# Patient Record
Sex: FEMALE | Race: WHITE | NOT HISPANIC OR LATINO | Employment: UNEMPLOYED | URBAN - METROPOLITAN AREA
[De-identification: names, ages, dates, MRNs, and addresses within clinical notes are randomized per-mention and may not be internally consistent; named-entity substitution may affect disease eponyms.]

---

## 2023-10-26 ENCOUNTER — OFFICE VISIT (OUTPATIENT)
Dept: URGENT CARE | Facility: CLINIC | Age: 18
End: 2023-10-26
Payer: COMMERCIAL

## 2023-10-26 VITALS
HEIGHT: 60 IN | DIASTOLIC BLOOD PRESSURE: 64 MMHG | HEART RATE: 100 BPM | BODY MASS INDEX: 23.37 KG/M2 | OXYGEN SATURATION: 98 % | SYSTOLIC BLOOD PRESSURE: 112 MMHG | WEIGHT: 119.05 LBS

## 2023-10-26 DIAGNOSIS — N92.6 IRREGULAR PERIODS: ICD-10-CM

## 2023-10-26 DIAGNOSIS — N89.8 VAGINAL DISCHARGE: Primary | ICD-10-CM

## 2023-10-26 DIAGNOSIS — Z32.01 POSITIVE URINE PREGNANCY TEST (HHS-HCC): ICD-10-CM

## 2023-10-26 LAB — PREGNANCY TEST URINE, POC: POSITIVE

## 2023-10-26 PROCEDURE — 87205 SMEAR GRAM STAIN: CPT

## 2023-10-26 PROCEDURE — 87661 TRICHOMONAS VAGINALIS AMPLIF: CPT

## 2023-10-26 PROCEDURE — 99214 OFFICE O/P EST MOD 30 MIN: CPT | Performed by: PHYSICIAN ASSISTANT

## 2023-10-26 PROCEDURE — 87800 DETECT AGNT MULT DNA DIREC: CPT

## 2023-10-26 PROCEDURE — 81025 URINE PREGNANCY TEST: CPT | Performed by: PHYSICIAN ASSISTANT

## 2023-10-26 NOTE — PROGRESS NOTES
Subjective   Patient ID: Zully Wilkerson is a 18 y.o. female.    HPI     Pt c/o vaginal discharge. She does not have regular periods. States that she bleeds heavily only once every couple of months. States that she gave birth March 2023 and periods have been irregular since then. Denies chance of pregnancy. Denies STD concerns, but wouldn't mind being tested. She is not currently breastfeeding. Denies: fever, chills, headache, abdominal pain, dysuria, blood in the urine, urinary frequency/urgency. The discharge is a white, creamy. No cottage cheese discharge or itching.    The following portions of the chart were reviewed this encounter and updated as appropriate:  Tobacco  Allergies  Meds  Problems  Med Hx  Surg Hx  Fam Hx         Review of Systems   All other systems reviewed and are negative.      Objective     Physical Exam  Constitutional:       General: She is awake.      Appearance: Normal appearance. She is well-developed.   HENT:      Head: Normocephalic and atraumatic.   Cardiovascular:      Rate and Rhythm: Normal rate.   Pulmonary:      Effort: Pulmonary effort is normal.   Genitourinary:     Exam position: Lithotomy position.      Pubic Area: No rash.       Labia:         Right: No rash, tenderness or lesion.         Left: No rash, tenderness or lesion.       Vagina: Vaginal discharge (clear mucous) present. No erythema, tenderness or lesions.      Cervix: Normal.      Uterus: Normal.    Musculoskeletal:      Cervical back: Normal range of motion and neck supple.      Right lower leg: No edema.      Left lower leg: No edema.   Skin:     General: Skin is warm and dry.      Findings: No lesion or rash.   Neurological:      General: No focal deficit present.      Mental Status: She is alert and oriented to person, place, and time.      Cranial Nerves: No facial asymmetry.      Motor: Motor function is intact.      Gait: Gait is intact.   Psychiatric:         Attention and Perception: Attention  normal.         Mood and Affect: Mood and affect normal.         Assessment/Plan   Diagnoses and all orders for this visit:  Vaginal discharge  -     Vaginitis Gram Stain For Bacterial Vaginosis + Yeast  -     TRICH VAGINALIS, AMPLIFIED  -     C. trachomatis + N. gonorrhoeae, Amplified  Irregular periods  -     POCT pregnancy, urine manually resulted  Positive urine pregnancy test    Vaginal examination shows clear, mucous-like discharge. POC pregnancy testing was positive. Pt made aware. Declined me calling in prenatal vitamins -- gave birth in March 2023 and still has plenty. She is also still established with her OBGYN. She also declined lab order for beta HCG testing, states that she will request this from OB - plans on making appt ASAP. Will call with test results when they come back. Testing ordered as above.     Red flag symptoms reviewed with patient and all questions answered. Patient or parent/guardian verbalized understanding and agreement with care plan as above. All in office testing reviewed with patient. If symptoms worsen or do not improve, patient is to follow up with PCP or report to the ER.

## 2023-10-27 LAB
CLUE CELLS VAG LPF-#/AREA: NORMAL /[LPF]
NUGENT SCORE: 2
T VAGINALIS RRNA SPEC QL NAA+PROBE: NEGATIVE
YEAST VAG WET PREP-#/AREA: NORMAL

## 2023-10-28 ENCOUNTER — TELEPHONE (OUTPATIENT)
Dept: URGENT CARE | Facility: CLINIC | Age: 18
End: 2023-10-28
Payer: COMMERCIAL

## 2023-10-28 DIAGNOSIS — N89.8 VAGINAL DISCHARGE: ICD-10-CM

## 2023-10-28 DIAGNOSIS — A74.9 CHLAMYDIA INFECTION: Primary | ICD-10-CM

## 2023-10-28 DIAGNOSIS — Z32.01 POSITIVE URINE PREGNANCY TEST (HHS-HCC): ICD-10-CM

## 2023-10-28 LAB
C TRACH RRNA SPEC QL NAA+PROBE: POSITIVE
N GONORRHOEA DNA SPEC QL PROBE+SIG AMP: NEGATIVE

## 2023-10-28 RX ORDER — AZITHROMYCIN 250 MG/1
1000 TABLET, FILM COATED ORAL ONCE
Qty: 4 TABLET | Refills: 0 | Status: SHIPPED | OUTPATIENT
Start: 2023-10-28 | End: 2023-10-28

## 2023-10-28 NOTE — TELEPHONE ENCOUNTER
----- Message from Nickie Hagen PA-C sent at 10/28/2023  2:50 PM EDT -----  Please let patient know that she did test positive for chlamydia. She tested negative for gonorrhea, trichomonas, BV, and yeast. I have sent the appropriate treatment into her pharmacy ([facility]) for her to take ASAP. She should avoid all sexual contact for at least 2 weeks after completing treatment.

## 2023-10-28 NOTE — TELEPHONE ENCOUNTER
Patient notified of results. Confirmed patients identify via . Patient advised to call back with any further questions.

## 2024-02-29 ENCOUNTER — OFFICE VISIT (OUTPATIENT)
Dept: URGENT CARE | Facility: CLINIC | Age: 19
End: 2024-02-29
Payer: COMMERCIAL

## 2024-02-29 VITALS
TEMPERATURE: 98.4 F | DIASTOLIC BLOOD PRESSURE: 77 MMHG | WEIGHT: 116.4 LBS | HEART RATE: 65 BPM | BODY MASS INDEX: 22.73 KG/M2 | OXYGEN SATURATION: 95 % | SYSTOLIC BLOOD PRESSURE: 128 MMHG

## 2024-02-29 DIAGNOSIS — J03.90 EXUDATIVE TONSILLITIS: ICD-10-CM

## 2024-02-29 DIAGNOSIS — B37.9 ANTIBIOTIC-INDUCED YEAST INFECTION: ICD-10-CM

## 2024-02-29 DIAGNOSIS — J02.9 SORE THROAT: ICD-10-CM

## 2024-02-29 DIAGNOSIS — T36.95XA ANTIBIOTIC-INDUCED YEAST INFECTION: ICD-10-CM

## 2024-02-29 DIAGNOSIS — H66.92 ACUTE OTITIS MEDIA, LEFT: Primary | ICD-10-CM

## 2024-02-29 PROBLEM — R82.71 GBS BACTERIURIA: Status: ACTIVE | Noted: 2022-08-24

## 2024-02-29 PROBLEM — O36.0990: Status: RESOLVED | Noted: 2023-03-09 | Resolved: 2024-02-29

## 2024-02-29 PROBLEM — O99.019 MATERNAL ANEMIA IN PREGNANCY, ANTEPARTUM (HHS-HCC): Status: RESOLVED | Noted: 2023-01-11 | Resolved: 2024-02-29

## 2024-02-29 PROBLEM — R73.09 ELEVATED GLUCOSE: Status: ACTIVE | Noted: 2023-01-11

## 2024-02-29 PROBLEM — O36.5930 POOR FETAL GROWTH AFFECTING MANAGEMENT OF MOTHER IN THIRD TRIMESTER (HHS-HCC): Status: RESOLVED | Noted: 2023-03-15 | Resolved: 2024-02-29

## 2024-02-29 PROBLEM — B00.9 HERPES SIMPLEX TYPE 2 (HSV-2) INFECTION AFFECTING PREGNANCY, ANTEPARTUM (HHS-HCC): Status: ACTIVE | Noted: 2022-10-03

## 2024-02-29 PROBLEM — O98.519 HERPES SIMPLEX TYPE 2 (HSV-2) INFECTION AFFECTING PREGNANCY, ANTEPARTUM (HHS-HCC): Status: ACTIVE | Noted: 2022-10-03

## 2024-02-29 PROBLEM — O36.80X0 ENCOUNTER TO DETERMINE FETAL VIABILITY OF PREGNANCY (HHS-HCC): Status: RESOLVED | Noted: 2024-02-29 | Resolved: 2024-02-29

## 2024-02-29 PROBLEM — O36.5990 IUGR (INTRAUTERINE GROWTH RESTRICTION) AFFECTING CARE OF MOTHER (HHS-HCC): Status: RESOLVED | Noted: 2023-03-09 | Resolved: 2024-02-29

## 2024-02-29 PROBLEM — J30.2 SEASONAL ALLERGIES: Status: ACTIVE | Noted: 2024-02-29

## 2024-02-29 PROBLEM — M77.8 LEFT WRIST TENDINITIS: Status: RESOLVED | Noted: 2024-02-29 | Resolved: 2024-02-29

## 2024-02-29 PROBLEM — O36.80X0 ENCOUNTER TO DETERMINE FETAL VIABILITY OF PREGNANCY (HHS-HCC): Status: ACTIVE | Noted: 2024-02-29

## 2024-02-29 PROBLEM — N76.0 RECURRENT VAGINITIS: Status: ACTIVE | Noted: 2024-02-29

## 2024-02-29 LAB — POC RAPID STREP: NEGATIVE

## 2024-02-29 PROCEDURE — 87880 STREP A ASSAY W/OPTIC: CPT

## 2024-02-29 PROCEDURE — 99213 OFFICE O/P EST LOW 20 MIN: CPT

## 2024-02-29 RX ORDER — ONDANSETRON 4 MG/1
TABLET, FILM COATED ORAL
COMMUNITY
Start: 2023-12-26

## 2024-02-29 RX ORDER — METHYLPREDNISOLONE 4 MG/1
TABLET ORAL
Qty: 21 TABLET | Refills: 0 | Status: SHIPPED | OUTPATIENT
Start: 2024-02-29

## 2024-02-29 RX ORDER — TRAMADOL HYDROCHLORIDE 50 MG/1
TABLET ORAL
COMMUNITY
Start: 2023-11-03

## 2024-02-29 RX ORDER — FLUCONAZOLE 150 MG/1
TABLET ORAL
Qty: 2 TABLET | Refills: 0 | Status: SHIPPED | OUTPATIENT
Start: 2024-02-29

## 2024-02-29 RX ORDER — AMOXICILLIN AND CLAVULANATE POTASSIUM 875; 125 MG/1; MG/1
1 TABLET, FILM COATED ORAL 2 TIMES DAILY
Qty: 20 TABLET | Refills: 0 | Status: SHIPPED | OUTPATIENT
Start: 2024-02-29 | End: 2024-03-10

## 2024-02-29 RX ORDER — DIAZEPAM 10 MG/1
TABLET ORAL
COMMUNITY
Start: 2023-11-03

## 2024-02-29 RX ORDER — MEDROXYPROGESTERONE ACETATE 150 MG/ML
INJECTION, SUSPENSION INTRAMUSCULAR
COMMUNITY
Start: 2023-11-03

## 2024-02-29 ASSESSMENT — ENCOUNTER SYMPTOMS
VOICE CHANGE: 1
DIZZINESS: 0
DIARRHEA: 0
FEVER: 0
RESPIRATORY NEGATIVE: 1
HEADACHES: 0
ARTHRALGIAS: 0
CARDIOVASCULAR NEGATIVE: 1
FATIGUE: 0
NEUROLOGICAL NEGATIVE: 1
NAUSEA: 0
MUSCULOSKELETAL NEGATIVE: 1
COUGH: 0
SORE THROAT: 1
MYALGIAS: 0
SHORTNESS OF BREATH: 0
VOMITING: 0
GASTROINTESTINAL NEGATIVE: 1
CHILLS: 0
DIAPHORESIS: 0
PALPITATIONS: 0
ABDOMINAL PAIN: 0
CONSTITUTIONAL NEGATIVE: 1

## 2024-02-29 NOTE — PROGRESS NOTES
Subjective     Zully Wilkerson is a 19 y.o. female who presents for Sore Throat and Earache.    Patient presents with sore throat, left ear pain, hoarse voice, and muffled hearing for the last week.       History provided by:  Patient and medical records  Sore Throat   Associated symptoms include ear pain. Pertinent negatives include no abdominal pain, congestion, coughing, diarrhea, headaches, shortness of breath or vomiting.   Earache   Associated symptoms include hearing loss and a sore throat. Pertinent negatives include no abdominal pain, coughing, diarrhea, headaches, rash, rhinorrhea or vomiting.       /77   Pulse 65   Temp 36.9 °C (98.4 °F)   Wt 52.8 kg (116 lb 6.4 oz)   SpO2 95%   BMI 22.73 kg/m²    All vitals have been reviewed and are stable.    Review of Systems   Constitutional: Negative.  Negative for chills, diaphoresis, fatigue and fever.   HENT:  Positive for ear pain, hearing loss, sore throat and voice change. Negative for congestion, rhinorrhea and sinus pressure.    Respiratory: Negative.  Negative for cough and shortness of breath.    Cardiovascular: Negative.  Negative for chest pain and palpitations.   Gastrointestinal: Negative.  Negative for abdominal pain, diarrhea, nausea and vomiting.   Musculoskeletal: Negative.  Negative for arthralgias and myalgias.   Skin: Negative.  Negative for rash.   Neurological: Negative.  Negative for dizziness and headaches.       Objective   Physical Exam  Vitals and nursing note reviewed.   Constitutional:       Appearance: Normal appearance. She is ill-appearing.   HENT:      Head: Normocephalic and atraumatic.      Right Ear: Tympanic membrane, ear canal and external ear normal.      Left Ear: Ear canal and external ear normal. A middle ear effusion is present. Tympanic membrane is erythematous.      Nose: Nose normal.      Mouth/Throat:      Lips: Pink.      Mouth: Mucous membranes are moist.      Pharynx: Uvula midline. Oropharyngeal exudate  and posterior oropharyngeal erythema present.      Tonsils: Tonsillar exudate present. 2+ on the right. 2+ on the left.   Eyes:      Extraocular Movements: Extraocular movements intact.      Conjunctiva/sclera: Conjunctivae normal.      Pupils: Pupils are equal, round, and reactive to light.   Cardiovascular:      Rate and Rhythm: Normal rate and regular rhythm.   Pulmonary:      Effort: Pulmonary effort is normal.      Breath sounds: Normal breath sounds. No stridor.   Abdominal:      General: Abdomen is flat.      Palpations: Abdomen is soft.   Musculoskeletal:         General: Normal range of motion.      Cervical back: Normal range of motion and neck supple.   Lymphadenopathy:      Head:      Right side of head: Submandibular and tonsillar adenopathy present.      Left side of head: Submandibular and tonsillar adenopathy present.   Skin:     General: Skin is warm and dry.   Neurological:      General: No focal deficit present.      Mental Status: She is alert and oriented to person, place, and time.   Psychiatric:         Mood and Affect: Mood normal.         Behavior: Behavior normal.         Assessment/Plan   Problem List Items Addressed This Visit    None  Visit Diagnoses       Acute otitis media, left    -  Primary    Relevant Medications    methylPREDNISolone (Medrol Dospak) 4 mg tablets    Sore throat        Relevant Medications    methylPREDNISolone (Medrol Dospak) 4 mg tablets    Other Relevant Orders    POCT rapid strep A manually resulted (Completed)    Antibiotic-induced yeast infection        Relevant Medications    fluconazole (Diflucan) 150 mg tablet    Exudative tonsillitis        Relevant Medications    methylPREDNISolone (Medrol Dospak) 4 mg tablets            Red flags for reporting to ER have been reviewed with the patient.    Current diagnosis, any medication changes, and all in-office lab or radiologic results have been reviewed with the patient at the time of the visit.   If symptoms do not  improve or worsen, patient is to follow up with PCP or report to the emergency room.   Patient is alert and oriented x3 and non-toxic appearing. Vital signs are stable.   Patient and/or guardian has sufficient decision-making capabilities at this time and reports understanding and agreement with the treatment plan made through shared decision-making.

## 2024-03-12 ASSESSMENT — ENCOUNTER SYMPTOMS
SINUS PRESSURE: 0
RHINORRHEA: 0

## 2025-01-13 ENCOUNTER — APPOINTMENT (OUTPATIENT)
Dept: URGENT CARE | Facility: CLINIC | Age: 20
End: 2025-01-13
Payer: COMMERCIAL